# Patient Record
Sex: FEMALE | NOT HISPANIC OR LATINO | Employment: UNEMPLOYED | URBAN - METROPOLITAN AREA
[De-identification: names, ages, dates, MRNs, and addresses within clinical notes are randomized per-mention and may not be internally consistent; named-entity substitution may affect disease eponyms.]

---

## 2017-01-03 ENCOUNTER — OFFICE VISIT (OUTPATIENT)
Dept: ORTHOPEDICS | Facility: CLINIC | Age: 52
End: 2017-01-03
Payer: MEDICAID

## 2017-01-03 VITALS
HEIGHT: 64 IN | BODY MASS INDEX: 27.83 KG/M2 | WEIGHT: 163 LBS | SYSTOLIC BLOOD PRESSURE: 171 MMHG | DIASTOLIC BLOOD PRESSURE: 84 MMHG | HEART RATE: 83 BPM

## 2017-01-03 DIAGNOSIS — M25.532 PAIN IN BOTH WRISTS: ICD-10-CM

## 2017-01-03 DIAGNOSIS — M25.531 PAIN IN BOTH WRISTS: ICD-10-CM

## 2017-01-03 DIAGNOSIS — M65.9 SYNOVITIS OF WRIST: Primary | ICD-10-CM

## 2017-01-03 PROCEDURE — 99214 OFFICE O/P EST MOD 30 MIN: CPT | Mod: S$PBB,,, | Performed by: ORTHOPAEDIC SURGERY

## 2017-01-03 PROCEDURE — 99213 OFFICE O/P EST LOW 20 MIN: CPT | Mod: PBBFAC,PO | Performed by: ORTHOPAEDIC SURGERY

## 2017-01-03 PROCEDURE — 99999 PR PBB SHADOW E&M-EST. PATIENT-LVL III: CPT | Mod: PBBFAC,,, | Performed by: ORTHOPAEDIC SURGERY

## 2017-01-03 RX ORDER — GABAPENTIN 300 MG/1
300 CAPSULE ORAL NIGHTLY
Qty: 90 CAPSULE | Refills: 0 | Status: SHIPPED | OUTPATIENT
Start: 2017-01-03 | End: 2017-01-31 | Stop reason: SDUPTHER

## 2017-01-03 RX ORDER — MELOXICAM 15 MG/1
15 TABLET ORAL DAILY
Qty: 60 TABLET | Refills: 0 | Status: SHIPPED | OUTPATIENT
Start: 2017-01-03 | End: 2017-01-31 | Stop reason: SDUPTHER

## 2017-01-03 RX ORDER — METHYLPREDNISOLONE 4 MG/1
TABLET ORAL
Qty: 1 PACKAGE | Refills: 0 | Status: SHIPPED | OUTPATIENT
Start: 2017-01-03 | End: 2017-01-24

## 2017-01-03 NOTE — MR AVS SNAPSHOT
[facility]  [address]  [place] [address]  Phone: [phone]                  Dena Marcano   1/3/2017 1:15 PM   Office Visit    Description:  Female : 1965   Provider:  Roger Gerber MD   Department:  [facility]           Reason for Visit     Right Wrist - Pain     Left Wrist - Pain           Diagnoses this Visit        Comments    Synovitis of wrist    -  Primary     Pain in both wrists                To Do List           Future Appointments        Provider Department Dept Phone    2017 10:00 AM Manuel aBrba DO [facility] [phone]    1/10/2017 10:00 AM DIABETES EMPOWERMENT [facility] Diabetes Management [phone]    2017 10:15 AM Roger Gerber MD [facility] [phone]    2017 10:05 AM LAB, [place] [facility] [phone]    2017 9:40 AM Kody Enriquez MD [facility] Hematology [phone]      Goals (5 Years of Data)     None       These Medications        Disp Refills Start End    methylPREDNISolone (MEDROL DOSEPACK) 4 mg tablet 1 Package 0 1/3/2017 2017    use as directed; complete this before starting meloxicam    Pharmacy: [place] [id number] [place] [facility] [address] [address] Ph #: [phone]       meloxicam (MOBIC) 15 MG tablet 60 tablet 0 1/3/2017     Take 1 tablet (15 mg total) by mouth once daily. Start this after the medrol dose pack - Oral    Pharmacy: [facility] [id number] [place] [facility] [address] [address] Ph #: [phone]       gabapentin (NEURONTIN) 300 MG capsule 90 capsule 0 1/3/2017 1/3/2018    Take 1 capsule (300 mg total) by mouth every evening. Begin taking one at night tonight - Oral    Pharmacy: [facility] [id number] [place] [facility] [address] [address] Ph #: [phone]         [facility] On Call     [facility] On Call Nurse Care Line -  Assistance  Registered nurses in the [facility] provide clinical advisement, health education,  appointment booking, and other advisory services.  Call for this free service at [phone].             Medications           Message regarding Medications     Verify the changes and/or additions to your medication regime listed below are the same as discussed with your clinician today.  If any of these changes or additions are incorrect, please notify your healthcare provider.        START taking these NEW medications        Refills    methylPREDNISolone (MEDROL DOSEPACK) 4 mg tablet 0    Sig: use as directed; complete this before starting meloxicam    Class: Normal    meloxicam (MOBIC) 15 MG tablet 0    Sig: Take 1 tablet (15 mg total) by mouth once daily. Start this after the medrol dose pack    Class: Normal    Route: Oral    gabapentin (NEURONTIN) 300 MG capsule 0    Sig: Take 1 capsule (300 mg total) by mouth every evening. Begin taking one at night tonight    Class: Normal    Route: Oral           Verify that the below list of medications is an accurate representation of the medications you are currently taking.  If none reported, the list may be blank. If incorrect, please contact your healthcare provider. Carry this list with you in case of emergency.           Current Medications     anastrozole (ARIMIDEX) 1 mg Tab Take 1 tablet (1 mg total) by mouth once daily.    ascorbic acid (VITAMIN C) 1000 MG tablet Take 1 tablet (1,000 mg total) by mouth once daily.    atorvastatin (LIPITOR) 20 MG tablet Take 1 tablet (20 mg total) by mouth once daily.    CALCIUM CARB/VIT D3/MINERALS (CALCIUM CARBONATE-VIT D3-MIN) 600 mg (1,500 mg)-200 unit Chew Take 2 tablets by mouth once daily.    ferrous sulfate 324 mg (65 mg iron) TbEC Take 1 tablet (325 mg total) by mouth once daily.    gabapentin (NEURONTIN) 300 MG capsule Take 1 capsule (300 mg total) by mouth every evening. Begin taking one at night tonight    glimepiride (AMARYL) 2 MG tablet Take 1 tablet (2 mg total) by mouth before breakfast.    lisinopril 10 MG  "tablet Take 1 tablet (10 mg total) by mouth once daily.    meloxicam (MOBIC) 15 MG tablet Take 1 tablet (15 mg total) by mouth once daily. Start this after the medrol dose pack    metformin (GLUCOPHAGE) 1000 MG tablet Take 1 tablet (1,000 mg total) by mouth 2 (two) times daily with meals.    methylPREDNISolone (MEDROL DOSEPACK) 4 mg tablet use as directed; complete this before starting meloxicam    ONETOUCH ULTRA TEST Strp USE TO TEST BLOOD SUGAR ONCE DAILY           Clinical Reference Information           Vital Signs - Last Recorded  Most recent update: 1/3/2017  1:30 PM by Abena Lugo LPN    BP Pulse Ht Wt BMI    (!) 171/84 83 5' 4" (1.626 m) 73.9 kg (163 lb) 27.98 kg/m2      Blood Pressure          Most Recent Value    BP  (!)  171/84      Allergies as of 1/3/2017     No Known Allergies      Immunizations Administered on Date of Encounter - 1/3/2017     None      Orders Placed During Today's Visit      Normal Orders This Visit    Ambulatory consult to Occupational Therapy       "

## 2017-01-03 NOTE — LETTER
January 3, 2017      Lucie Rose NP  [address] [facility] [place] [address]           [facility]  [address] [facility] [place] [address]  Phone: [phone]          Patient: Dena Marcano   MR Number: [id number]   YOB: 1965   Date of Visit: 1/3/2017       Dear Lucie Rose:    Thank you for referring Dena Marcano to me for evaluation. Attached you will find relevant portions of my assessment and plan of care.    If you have questions, please do not hesitate to call me. I look forward to following Dena Marcano along with you.    Sincerely,    Roger Gerber MD    Enclosure  CC:  No Recipients    If you would like to receive this communication electronically, please contact [email or web address] or [phone] to request more information on [employer] access.    For providers and/or their staff who would like to refer a patient to [facility], please contact us through our one-stop-shop provider referral line, Jose Francisco Hoffman, at [phone].    If you feel you have received this communication in error or would no longer like to receive these types of communications, please e-mail [email or web address]

## 2017-01-03 NOTE — PROGRESS NOTES
Subjective:          Chief Complaint: Dena Marcano is a 51 y.o. female who had concerns including Pain of the Right Wrist and Pain of the Left Wrist.    Pain   This is a new problem. The problem occurs constantly. The problem has been rapidly worsening. Associated symptoms include diaphoresis and myalgias. Pertinent negatives include no chest pain, chills, coughing, fever, headaches, nausea, rash or vomiting. She has tried acetaminophen for the symptoms. The treatment provided no relief.   She has also tried bilateral wrist bracing without any change in the pain. She has some additional upper extremity symptoms as well, however the wrist/hand pain is primary. NO numbness or tingling noted.by the patient    Review of Systems   Constitution: Positive for diaphoresis. Negative for chills and fever.        Hair loss   HENT: Positive for tinnitus. Negative for ear pain, headaches, hearing loss and nosebleeds.    Eyes: Negative for pain, redness and visual disturbance.   Cardiovascular: Negative for chest pain and palpitations.   Respiratory: Negative for cough and shortness of breath.    Skin: Negative for itching and rash.   Musculoskeletal: Positive for joint pain, muscle weakness, myalgias and stiffness.   Gastrointestinal: Positive for diarrhea. Negative for constipation, nausea and vomiting.   Genitourinary: Negative for dysuria, frequency and hematuria.   Neurological: Negative for seizures.   Psychiatric/Behavioral: The patient is not nervous/anxious.    Allergic/Immunologic: Negative for environmental allergies.                   Objective:        General: Dena is well-developed, well-nourished, appears stated age, in no acute distress, alert and oriented to time, place and person.     General    Vitals reviewed.  Constitutional: She is oriented to person, place, and time. She appears well-developed and well-nourished. She appears distressed.   HENT:   Head: Normocephalic and atraumatic.   Nose: Nose normal.    Eyes: Pupils are equal, round, and reactive to light.   Cardiovascular: Normal rate.    Pulmonary/Chest: Effort normal.   Neurological: She is alert and oriented to person, place, and time.   Psychiatric: Judgment and thought content normal.   Patient is tearful regarding her hand/wrist pain and her inability to perform ADLs secondary to it.             Right Hand/Wrist Exam     Inspection   Scars: Wrist - absent Hand -  absent  Effusion: Wrist - absent Hand -  absent  Bruising: Wrist - absent Hand -  absent  Deformity: Wrist - deformity Hand -  deformity    Pain   Hand - The patient exhibits pain of the extensory musculature.  Wrist - The patient exhibits pain of the extensory musculature.    Swelling   Hand - The patient is swollen on the extensory musculature.  Wrist - The patient is swollen on the extensory musculature.    Tenderness   The patient is tender to palpation of the dorsal area.    Other     Neuorologic Exam    Median Distribution: normal  Ulnar Distribution: normal  Radial Distribution: normal    Comments:  Very difficult to examine the patient bilaterally secondary to pain. She is unable to  with either hand.      Left Hand/Wrist Exam     Inspection   Scars: Wrist - absent Hand -  absent  Effusion: Wrist - absent Hand -  absent  Bruising: Wrist - absent Hand -  absent  Deformity: Wrist - absent Hand -  absent    Pain   Hand - The patient exhibits pain of the extensory musculature.  Wrist - The patient exhibits pain of the extensory musculature.    Swelling   Hand - The patient is swollen on the extensory musculature.  Wrist - The patient is swollen on the extensory musculature.    Tenderness   The patient is tender to palpation of the dorsal area.     Other     Sensory Exam  Median Distribution: normal  Ulnar Distribution: normal  Radial Distribution: normal          Vascular Exam       Capillary Refill  Right Hand: normal capillary refill  Left Hand: normal capillary refill    Current and  previous radiographic studies and results were reviewed with the patient:     3 views of each wrist demonstrate minimal degenerative changes at the first carpometacarpal joints but no evidence of fracture, subluxation or bony erosion.        Assessment:       Encounter Diagnoses   Name Primary?    Synovitis of wrist Yes    Pain in both wrists           Plan:         I spent >50% of the time discussing the patient's current symptoms and x-ray findings with her as well as discussing some of her labwork that she was unaware of. She is complaining of hair loss, diffuse myalgias and joint pain. Her TSH is low and A1c is very elevated. We will attempt to get her the appropriate f/u for the abnormal TSH in particular. She is working on getting her sugars better controlled and states they are in the 90s at home.  Medrol Dosepack for inflammation  Cannot give regular NSAIDs secondary to listed dx of CKD Stage 3  PHysical therapy for gradual decreases in pain and increases in motion and function  F/U

## 2017-01-09 ENCOUNTER — OFFICE VISIT (OUTPATIENT)
Dept: ENDOCRINOLOGY | Facility: CLINIC | Age: 52
End: 2017-01-09
Payer: MEDICAID

## 2017-01-09 ENCOUNTER — LAB VISIT (OUTPATIENT)
Dept: LAB | Facility: HOSPITAL | Age: 52
End: 2017-01-09
Attending: INTERNAL MEDICINE
Payer: MEDICAID

## 2017-01-09 VITALS
WEIGHT: 167.75 LBS | HEIGHT: 64 IN | HEART RATE: 87 BPM | SYSTOLIC BLOOD PRESSURE: 148 MMHG | BODY MASS INDEX: 28.64 KG/M2 | DIASTOLIC BLOOD PRESSURE: 80 MMHG

## 2017-01-09 DIAGNOSIS — E11.9 TYPE 2 DIABETES MELLITUS WITHOUT COMPLICATION, WITHOUT LONG-TERM CURRENT USE OF INSULIN: ICD-10-CM

## 2017-01-09 DIAGNOSIS — E05.90 SUBCLINICAL HYPERTHYROIDISM: ICD-10-CM

## 2017-01-09 DIAGNOSIS — E05.90 SUBCLINICAL HYPERTHYROIDISM: Primary | ICD-10-CM

## 2017-01-09 LAB — THYROPEROXIDASE IGG SERPL-ACNC: <6 IU/ML

## 2017-01-09 PROCEDURE — 99214 OFFICE O/P EST MOD 30 MIN: CPT | Mod: S$PBB,,, | Performed by: INTERNAL MEDICINE

## 2017-01-09 PROCEDURE — 99999 PR PBB SHADOW E&M-EST. PATIENT-LVL III: CPT | Mod: PBBFAC,,, | Performed by: INTERNAL MEDICINE

## 2017-01-09 PROCEDURE — 80053 COMPREHEN METABOLIC PANEL: CPT

## 2017-01-09 PROCEDURE — 84443 ASSAY THYROID STIM HORMONE: CPT

## 2017-01-09 PROCEDURE — 86376 MICROSOMAL ANTIBODY EACH: CPT

## 2017-01-09 PROCEDURE — 84481 FREE ASSAY (FT-3): CPT

## 2017-01-09 PROCEDURE — 84439 ASSAY OF FREE THYROXINE: CPT

## 2017-01-09 PROCEDURE — 84445 ASSAY OF TSI GLOBULIN: CPT

## 2017-01-09 PROCEDURE — 83036 HEMOGLOBIN GLYCOSYLATED A1C: CPT

## 2017-01-09 NOTE — PROGRESS NOTES
CHIEF COMPLAINT: Subclinical Hyperthyroidism  51 year old being seen as a new patient to me. Seeing [facility] for DM management. Found to have a suppressed TSH. She has noticed some hair loss over the past 3-4 months. No palpitations. No tremors. She has some diarrhea but states it is chronic. Occasionally diaphoretic      PAST MEDICAL HISTORY/PAST SURGICAL HISTORY:  Reviewed in EPIC    SOCIAL HISTORY: No T/A    FAMILY HISTORY:  + DM 2. No known thyroid disease.     MEDICATIONS/ALLERGIES: The patient's MedCard has been updated and reviewed.      ROS:   Constitutional: No recent significant weight change  Eyes: No recent visual changes  ENT: No dysphagia  Cardiovascular: Denies current anginal symptoms  Respiratory: Denies current respiratory difficulty  Gastrointestinal: No N/V  GenitoUrinary - No dysuria  Skin: No new skin rash  Neurologic: No focal neurologic complaints  Remainder ROS negative        PE:    GENERAL: Well developed, well nourished.  PSYCH:  appropriate mood and affect  EYES:  PERRL, EOM intact.  ENT: Nares patent, oropharynx clear, mucosa pink,   NECK: Supple, trachea midline, No palpable thyroid nodules  CHEST: Resp even and unlabored, CTA bilateral.  CARDIAC: RRR, S1, S2 heard, no murmurs, rubs, S3, or S4  VASCULAR:  DP pulses +2/4 bilaterally, no edema  NEURO: Gait steady, CN II-VII grossly intact  SKIN: No areas of breakdown, no acanthosis nigracans.    Results for JESSE SALCEDO (MRN [id number]) as of 1/9/2017 10:19   Ref. Range 10/30/2015 09:15 8/9/2016 09:29 10/4/2016 10:00 12/20/2016 15:22   TSH Latest Ref Range: 0.400 - 4.000 uIU/mL 0.380 (L) 0.218 (L)  0.231 (L)   Free T4 Latest Ref Range: 0.71 - 1.51 ng/dL 1.10 1.15  1.11       ASSESSMENT/PLAN:  1. Subclinical Hyperthyroidism- TSH mildly suppressed. Main compliant is hair loss. Has diarrhea but appears chronic. Will do w/u as seen below. Depending on w/u.     2. DM 2- BG of 58 in clinic. States that A1c increased due to steroids. Now off all  steroids. No glucose logs with her today. Recently finished steroids and had a BG in the 60's 2 days ago. Will stop glimepiride. Patient kept in office until BG increased.  with her.Also advised patient to go ahead and eat lunch      FOLLOWUP  Today- CMP, A1c, TSH, Ft4, Ft3, TSI, TPO  Thyroid Uptake and scan.

## 2017-01-09 NOTE — MR AVS SNAPSHOT
[facility]  [address] [facility]  [facility] [address]  Phone: [phone]  Fax: [phone]                  Dena Marcano   2017 10:00 AM   Office Visit    Description:  Female : 1965   Provider:  Manuel Barba DO   Department:  [facility]           Diagnoses this Visit        Comments    Subclinical hyperthyroidism    -  Primary     Type 2 diabetes mellitus without complication, without long-term current use of insulin                To Do List           Future Appointments        Provider Department Dept Phone    2017 11:35 AM LAB, [place] [facility] [phone]    1/10/2017 10:00 AM DIABETES EMPOWERMENT [facility] Diabetes Management [phone]    2017 11:00 AM [facility] [phone]    2017 10:15 AM Roger Gerber MD [facility] [phone]    2017 10:05 AM LAB, [place] [facility] [phone]      Goals (5 Years of Data)     None      [facility]     [facility] Nurse Care Line - /7 Assistance  Registered nurses in the [facility] provide clinical advisement, health education, appointment booking, and other advisory services.  Call for this free service at [phone].             Medications           Message regarding Medications     Verify the changes and/or additions to your medication regime listed below are the same as discussed with your clinician today.  If any of these changes or additions are incorrect, please notify your healthcare provider.        STOP taking these medications     glimepiride (AMARYL) 2 MG tablet Take 1 tablet (2 mg total) by mouth before breakfast.           Verify that the below list of medications is an accurate representation of the medications you are currently taking.  If none reported, the list may be blank. If incorrect, please contact your healthcare provider. Carry this list  "with you in case of emergency.           Current Medications     anastrozole (ARIMIDEX) 1 mg Tab Take 1 tablet (1 mg total) by mouth once daily.    ascorbic acid (VITAMIN C) 1000 MG tablet Take 1 tablet (1,000 mg total) by mouth once daily.    atorvastatin (LIPITOR) 20 MG tablet Take 1 tablet (20 mg total) by mouth once daily.    ferrous sulfate 324 mg (65 mg iron) TbEC Take 1 tablet (325 mg total) by mouth once daily.    gabapentin (NEURONTIN) 300 MG capsule Take 1 capsule (300 mg total) by mouth every evening. Begin taking one at night tonight    lisinopril 10 MG tablet Take 1 tablet (10 mg total) by mouth once daily.    meloxicam (MOBIC) 15 MG tablet Take 1 tablet (15 mg total) by mouth once daily. Start this after the medrol dose pack    metformin (GLUCOPHAGE) 1000 MG tablet Take 1 tablet (1,000 mg total) by mouth 2 (two) times daily with meals.    methylPREDNISolone (MEDROL DOSEPACK) 4 mg tablet use as directed; complete this before starting meloxicam    ONETOUCH ULTRA TEST Strp USE TO TEST BLOOD SUGAR ONCE DAILY    CALCIUM CARB/VIT D3/MINERALS (CALCIUM CARBONATE-VIT D3-MIN) 600 mg (1,500 mg)-200 unit Chew Take 2 tablets by mouth once daily.           Clinical Reference Information           Vital Signs - Last Recorded  Most recent update: 1/9/2017 10:20 AM by Loren Alfaro LPN    BP Pulse Ht Wt BMI    (!) 148/80 (BP Location: Right arm, Patient Position: Sitting, BP Method: Manual) 87 5' 4" (1.626 m) 76.1 kg (167 lb 12.3 oz) 28.8 kg/m2      Blood Pressure          Most Recent Value    BP  (!)  148/80      Allergies as of 1/9/2017     No Known Allergies      Immunizations Administered on Date of Encounter - 1/9/2017     None      Orders Placed During Today's Visit     Future Labs/Procedures Expected by Expires    Comprehensive metabolic panel  1/9/2017 1/10/2018    Hemoglobin A1c  1/9/2017 1/10/2018    NM Thyroid Scan and Uptake Sngl or Multi  1/9/2017 1/9/2018    T3, free  1/9/2017 1/9/2018    T4, free  " 1/9/2017 1/9/2018    Thyroid peroxidase antibody  1/9/2017 1/9/2018    Thyroid stimulating immunoglobulin  1/9/2017 1/9/2018    TSH  1/9/2017 1/9/2018

## 2017-01-10 ENCOUNTER — CLINICAL SUPPORT (OUTPATIENT)
Dept: DIABETES | Facility: CLINIC | Age: 52
End: 2017-01-10
Payer: MEDICAID

## 2017-01-10 ENCOUNTER — TELEPHONE (OUTPATIENT)
Dept: ENDOCRINOLOGY | Facility: CLINIC | Age: 52
End: 2017-01-10

## 2017-01-10 VITALS
DIASTOLIC BLOOD PRESSURE: 72 MMHG | WEIGHT: 167.75 LBS | BODY MASS INDEX: 28.64 KG/M2 | HEIGHT: 64 IN | HEART RATE: 83 BPM | SYSTOLIC BLOOD PRESSURE: 134 MMHG

## 2017-01-10 DIAGNOSIS — E11.9 TYPE 2 DIABETES MELLITUS WITHOUT COMPLICATION, WITHOUT LONG-TERM CURRENT USE OF INSULIN: Primary | ICD-10-CM

## 2017-01-10 DIAGNOSIS — R94.6 ABNORMAL THYROID FUNCTION TEST: Primary | ICD-10-CM

## 2017-01-10 DIAGNOSIS — Z51.81 MEDICATION MONITORING ENCOUNTER: ICD-10-CM

## 2017-01-10 LAB
ALBUMIN SERPL BCP-MCNC: 3.5 G/DL
ALP SERPL-CCNC: 50 U/L
ALT SERPL W/O P-5'-P-CCNC: 13 U/L
ANION GAP SERPL CALC-SCNC: 8 MMOL/L
AST SERPL-CCNC: 12 U/L
BILIRUB SERPL-MCNC: 0.1 MG/DL
BUN SERPL-MCNC: 21 MG/DL
CALCIUM SERPL-MCNC: 9.6 MG/DL
CHLORIDE SERPL-SCNC: 108 MMOL/L
CO2 SERPL-SCNC: 24 MMOL/L
CREAT SERPL-MCNC: 1 MG/DL
EST. GFR  (AFRICAN AMERICAN): >60 ML/MIN/1.73 M^2
EST. GFR  (NON AFRICAN AMERICAN): >60 ML/MIN/1.73 M^2
ESTIMATED AVG GLUCOSE: 235 MG/DL
GLUCOSE SERPL-MCNC: 99 MG/DL
HBA1C MFR BLD HPLC: 9.8 %
POTASSIUM SERPL-SCNC: 4.8 MMOL/L
PROT SERPL-MCNC: 6.9 G/DL
SODIUM SERPL-SCNC: 140 MMOL/L
T3FREE SERPL-MCNC: 2.5 PG/ML
T4 FREE SERPL-MCNC: 1.09 NG/DL
TSH SERPL DL<=0.005 MIU/L-ACNC: 0.57 UIU/ML

## 2017-01-10 PROCEDURE — 99999 PR PBB SHADOW E&M-EST. PATIENT-LVL III: CPT | Mod: PBBFAC,,,

## 2017-01-10 PROCEDURE — 99213 OFFICE O/P EST LOW 20 MIN: CPT | Mod: S$PBB,,, | Performed by: NURSE PRACTITIONER

## 2017-01-10 PROCEDURE — 99213 OFFICE O/P EST LOW 20 MIN: CPT | Mod: PBBFAC,PO

## 2017-01-10 NOTE — MR AVS SNAPSHOT
[place]  [address]  [place] [facility] [place] [address]  Phone: [phone]                  Dena Marcano   1/10/2017 10:00 AM   Clinical Support    Description:  Female : 1965   Provider:  DIABETES EMPOWERMENT [facility]   Department:  [place] Diabetes Management           Diagnoses this Visit        Comments    Type 2 diabetes mellitus without complication, without long-term current use of insulin    -  Primary            To Do List           Future Appointments        Provider Department Dept Phone    2017 11:00 AM [facility] [phone]    2017 7:30 AM [facility] [phone]    2017 12:15 PM NM1 [facility] [phone]    2017 7:30 AM [facility] [phone]    2017 10:15 AM Roger Gerber MD [place] [facility] [phone]      Goals (5 Years of Data)     None      [facility] On Call     [facility] On [facility] Nurse Care Line -  Assistance  Registered nurses in the [facility] provide clinical advisement, health education, appointment booking, and other advisory services.  Call for this free service at [phone].             Medications           Message regarding Medications     Verify the changes and/or additions to your medication regime listed below are the same as discussed with your clinician today.  If any of these changes or additions are incorrect, please notify your healthcare provider.             Verify that the below list of medications is an accurate representation of the medications you are currently taking.  If none reported, the list may be blank. If incorrect, please contact your healthcare provider. Carry this list with you in case of emergency.           Current Medications     anastrozole (ARIMIDEX) 1 mg Tab Take 1 tablet (1 mg total) by mouth once daily.    ascorbic acid (VITAMIN C) 1000 MG tablet  "Take 1 tablet (1,000 mg total) by mouth once daily.    atorvastatin (LIPITOR) 20 MG tablet Take 1 tablet (20 mg total) by mouth once daily.    ferrous sulfate 324 mg (65 mg iron) TbEC Take 1 tablet (325 mg total) by mouth once daily.    gabapentin (NEURONTIN) 300 MG capsule Take 1 capsule (300 mg total) by mouth every evening. Begin taking one at night tonight    lisinopril 10 MG tablet Take 1 tablet (10 mg total) by mouth once daily.    meloxicam (MOBIC) 15 MG tablet Take 1 tablet (15 mg total) by mouth once daily. Start this after the medrol dose pack    metformin (GLUCOPHAGE) 1000 MG tablet Take 1 tablet (1,000 mg total) by mouth 2 (two) times daily with meals.    methylPREDNISolone (MEDROL DOSEPACK) 4 mg tablet use as directed; complete this before starting meloxicam    ONETOUCH ULTRA TEST Strp USE TO TEST BLOOD SUGAR ONCE DAILY    CALCIUM CARB/VIT D3/MINERALS (CALCIUM CARBONATE-VIT D3-MIN) 600 mg (1,500 mg)-200 unit Chew Take 2 tablets by mouth once daily.           Clinical Reference Information           Vital Signs - Last Recorded  Most recent update: 1/10/2017  9:51 AM by Loren Alfaro LPN    BP Pulse Ht Wt BMI    134/72 (BP Location: Right arm, Patient Position: Sitting, BP Method: Manual) 83 5' 4" (1.626 m) 76.1 kg (167 lb 12.3 oz) 28.8 kg/m2      Blood Pressure          Most Recent Value    BP  134/72      Allergies as of 1/10/2017     No Known Allergies      Immunizations Administered on Date of Encounter - 1/10/2017     None      Orders Placed During Today's Visit     Future Labs/Procedures Expected by Expires    Hemoglobin A1c  1/10/2017 1/10/2018      "

## 2017-01-10 NOTE — PROGRESS NOTES
CC:  Uncontrolled Type 2 DM     HPI:  Dena Marcano 51 y.o.  or Other  female referred by Dr. Saha/AMI Rose NP  for eval of Diabetes.      [facility] - Visit # 1.   ---back today for interim glucose log review.     The patient has had Type 2  Diabetes mellitus  diagnosed approximately 2013 along with known associated co-morbidities pending review  including HTN, HLP.  Pt has also been treated for breast cancer in the last year with lumpectomy and RTX.  Pt was prescribed metformin and glipizide.  She never started the glipizide because she thought she was taking too many pills already.  She knows nothing about an a1c.  She checks fbs about EVERY OTHER DAY.    Interim Events:  Pt doing fantastic.  Taking metformin 1000 bid,  Was taking glimipiride but started having glucoses in 50-60--so glimpiride was stopped per Dr. Barba as she is seeing him for subclinical hypothyroidism. Has also been watching diet.             CURRENT DIABETES MEDICATIONS: metformin 1000 bid--glimipiride 2 mg-just stopped.     SIGNIFICANT DIABETES MED HISTORY: metformin 500 twice daily     HOSPITALIZATIONS FOR DIABETES OR RELATED COMPLICATIONS:  No    BLOOD GLUCOSE MONITORING:  n          HYPOGLYCEMIA: yes    DIABETES COMPLICATIONS: none     CURRENT A1C:      OCCUPATION: none     CURRENT DAILY ROUTINE (meals/meds): pending review     CURRENT EXERCISE:  None     ALCOHOL: none    SMOKING: none     SUPPORT SYSTEM: none     DIABETES EDUCATION HISTORY: none     MEDICATIONS, ALLERGIES, LABS, VS's, MEDICAL, SURGICAL and SOCIAL HISTORY reviewed and updated in the appropriate sections during this encounter.    ROS:     CGMS interpretation:       PE:  Constitutional:    Vitals:    01/10/17 0948   BP: 134/72   Pulse: 83        Well developed, well nourished, NAD.      LABS  Hemoglobin A1C   Date Value Ref Range Status   10/04/2016 11.5 (H) 4.5 - 6.2 % Final     Comment:     According to ADA guidelines, hemoglobin  A1C <7.0% represents  optimal control in non-pregnant diabetic patients.  Different  metrics may apply to specific populations.   Standards of Medical Care in Diabetes - 2016.  For the purpose of screening for the presence of diabetes:  <5.7%     Consistent with the absence of diabetes  5.7-6.4%  Consistent with increasing risk for diabetes   (prediabetes)  >or=6.5%  Consistent with diabetes  Currently no consensus exists for use of hemoglobin A1C  for diagnosis of diabetes for children.     08/09/2016 11.8 (H) 4.5 - 6.2 % Final     Comment:     According to ADA guidelines, hemoglobin A1C <7.0% represents  optimal control in non-pregnant diabetic patients.  Different  metrics may apply to specific populations.   Standards of Medical Care in Diabetes - 2016.  For the purpose of screening for the presence of diabetes:  <5.7%     Consistent with the absence of diabetes  5.7-6.4%  Consistent with increasing risk for diabetes   (prediabetes)  >or=6.5%  Consistent with diabetes  Currently no consensus exists for use of hemoglobin A1C  for diagnosis of diabetes for children.     10/30/2015 7.5 (H) 4.5 - 6.2 % Final       Chemistry        Component Value Date/Time     01/09/2017 1128    K 4.8 01/09/2017 1128     01/09/2017 1128    CO2 24 01/09/2017 1128    BUN 21 (H) 01/09/2017 1128    CREATININE 1.0 01/09/2017 1128    GLU 99 01/09/2017 1128        Component Value Date/Time    CALCIUM 9.6 01/09/2017 1128    ALKPHOS 50 (L) 01/09/2017 1128    AST 12 01/09/2017 1128    ALT 13 01/09/2017 1128    BILITOT 0.1 01/09/2017 1128          Lab Results   Component Value Date    CHOL 174 08/09/2016    CHOL 174 08/09/2016    CHOL 147 07/30/2015     Lab Results   Component Value Date    HDL 43 08/09/2016    HDL 43 08/09/2016    HDL 42 07/30/2015     Lab Results   Component Value Date    LDLCALC 100.6 08/09/2016    LDLCALC 100.6 08/09/2016    LDLCALC 82.0 07/30/2015     Lab Results   Component Value Date    TRIG 152 (H)  08/09/2016    TRIG 152 (H) 08/09/2016    TRIG 115 07/30/2015     Lab Results   Component Value Date    CHOLHDL 24.7 08/09/2016    CHOLHDL 24.7 08/09/2016    CHOLHDL 28.6 07/30/2015     @RESUFAST([facility])    STANDARDS of CARE:  Statin:  Yes - atorvastatin   ACEI or ARB:  Yes - lisinipril 5 mg   ASA:  No      ______________________________________________________________________     ASSESSMENT  1. Type 2 diabetes mellitus without complication, without long-term current use of insulin  Hemoglobin A1c   2. Medication monitoring encounter           PLAN  -d/c glimipride--continue metfformin 1000 bid,  Pt advised to continue checking glucoses qd but at different times.  If glucoses trend up will consider 1 mg glimipiride.     ORDERS 1/10/17  F/u as previously ordered.

## 2017-01-10 NOTE — TELEPHONE ENCOUNTER
Pt's  informed thyroid levels normal per Dr. Barba and needs repeat TSH in 3 mos.  Lab appt scheduled and letter mailed.

## 2017-01-11 LAB — TSI SER-ACNC: <0.1 IU/L

## 2017-01-16 ENCOUNTER — HOSPITAL ENCOUNTER (OUTPATIENT)
Dept: RADIOLOGY | Facility: HOSPITAL | Age: 52
Discharge: HOME | End: 2017-01-16
Attending: INTERNAL MEDICINE
Payer: MEDICAID

## 2017-01-16 ENCOUNTER — TELEPHONE (OUTPATIENT)
Dept: ENDOCRINOLOGY | Facility: CLINIC | Age: 52
End: 2017-01-16

## 2017-01-16 DIAGNOSIS — E05.90 SUBCLINICAL HYPERTHYROIDISM: ICD-10-CM

## 2017-01-16 PROCEDURE — 78014 THYROID IMAGING W/BLOOD FLOW: CPT | Mod: 26,,, | Performed by: RADIOLOGY

## 2017-01-17 ENCOUNTER — HOSPITAL ENCOUNTER (OUTPATIENT)
Dept: RADIOLOGY | Facility: HOSPITAL | Age: 52
Discharge: HOME | End: 2017-01-17
Attending: INTERNAL MEDICINE
Payer: MEDICAID

## 2017-01-17 PROCEDURE — 78014 THYROID IMAGING W/BLOOD FLOW: CPT | Mod: TC,PO

## 2017-01-31 ENCOUNTER — TELEPHONE (OUTPATIENT)
Dept: ENDOCRINOLOGY | Facility: CLINIC | Age: 52
End: 2017-01-31

## 2017-01-31 ENCOUNTER — OFFICE VISIT (OUTPATIENT)
Dept: ORTHOPEDICS | Facility: CLINIC | Age: 52
End: 2017-01-31
Payer: MEDICAID

## 2017-01-31 ENCOUNTER — HOSPITAL ENCOUNTER (OUTPATIENT)
Dept: RADIOLOGY | Facility: HOSPITAL | Age: 52
Discharge: HOME | End: 2017-01-31
Attending: ORTHOPAEDIC SURGERY
Payer: MEDICAID

## 2017-01-31 VITALS
SYSTOLIC BLOOD PRESSURE: 161 MMHG | DIASTOLIC BLOOD PRESSURE: 84 MMHG | HEART RATE: 79 BPM | WEIGHT: 167 LBS | HEIGHT: 64 IN | BODY MASS INDEX: 28.51 KG/M2

## 2017-01-31 DIAGNOSIS — M25.532 PAIN IN BOTH WRISTS: ICD-10-CM

## 2017-01-31 DIAGNOSIS — M25.531 PAIN IN BOTH WRISTS: ICD-10-CM

## 2017-01-31 DIAGNOSIS — M89.8X2 PAIN OF RIGHT HUMERUS: Primary | ICD-10-CM

## 2017-01-31 DIAGNOSIS — M25.612 SHOULDER STIFFNESS, LEFT: ICD-10-CM

## 2017-01-31 DIAGNOSIS — M65.9 SYNOVITIS OF WRIST: ICD-10-CM

## 2017-01-31 DIAGNOSIS — M89.8X2 PAIN OF RIGHT HUMERUS: ICD-10-CM

## 2017-01-31 PROCEDURE — 99999 PR PBB SHADOW E&M-EST. PATIENT-LVL III: CPT | Mod: PBBFAC,,, | Performed by: ORTHOPAEDIC SURGERY

## 2017-01-31 PROCEDURE — 73060 X-RAY EXAM OF HUMERUS: CPT | Mod: 26,RT,, | Performed by: RADIOLOGY

## 2017-01-31 PROCEDURE — 99214 OFFICE O/P EST MOD 30 MIN: CPT | Mod: S$PBB,,, | Performed by: ORTHOPAEDIC SURGERY

## 2017-01-31 PROCEDURE — 73060 X-RAY EXAM OF HUMERUS: CPT | Mod: TC,PO,RT

## 2017-01-31 RX ORDER — GABAPENTIN 300 MG/1
300 CAPSULE ORAL NIGHTLY
Qty: 90 CAPSULE | Refills: 0 | Status: SHIPPED | OUTPATIENT
Start: 2017-01-31 | End: 2017-06-22 | Stop reason: SDUPTHER

## 2017-01-31 RX ORDER — MELOXICAM 15 MG/1
15 TABLET ORAL DAILY
Qty: 60 TABLET | Refills: 0 | Status: SHIPPED | OUTPATIENT
Start: 2017-01-31 | End: 2017-05-05 | Stop reason: SDUPTHER

## 2017-01-31 NOTE — MR AVS SNAPSHOT
[facility]  [address]  [facility] [address]  Phone: [phone]                  Dena Marcano   2017 10:15 AM   Office Visit    Description:  Female : 1965   Provider:  Roger Gerber MD   Department:  [facility]           Reason for Visit     Right Wrist - Pain     Left Wrist - Pain           Diagnoses this Visit        Comments    Synovitis of wrist    -  Primary     Pain in both wrists         Pain of right humerus                To Do List           Future Appointments        Provider Department Dept Phone    2017 12:00 PM [facility] XR3 [facility] [phone]    2017 10:30 AM [facility] MRI1 [facility] [phone]    2017 10:05 AM [facility] [place] [facility] [phone]    2017 9:40 AM Kody Enriquez MD [facility] [phone]    2017 10:00 AM CHAIR 12, [facility] CHEMO [facility] [phone]      Goals (5 Years of Data)     None       These Medications        Disp Refills Start End    gabapentin (NEURONTIN) 300 MG capsule 90 capsule 0 2017    Take 1 capsule (300 mg total) by mouth every evening. Begin taking one at night tonight - Oral    Pharmacy: [place] [id number] [place] [facility] [id number] Ph #: [phone]       meloxicam (MOBIC) 15 MG tablet 60 tablet 0 2017     Take 1 tablet (15 mg total) by mouth once daily. Start this after the medrol dose pack - Oral    Pharmacy: [facility] [id number] [place] [facility] [address] [address] Ph #: [phone]         [facility]     [facility] Care [facility] -  Assistance  Registered nurses in the [facility] provide clinical advisement, health education, appointment booking, and other advisory services.  Call for this free service at [phone].             Medications           Message regarding Medications     Verify the changes and/or additions to  "your medication regime listed below are the same as discussed with your clinician today.  If any of these changes or additions are incorrect, please notify your healthcare provider.             Verify that the below list of medications is an accurate representation of the medications you are currently taking.  If none reported, the list may be blank. If incorrect, please contact your healthcare provider. Carry this list with you in case of emergency.           Current Medications     anastrozole (ARIMIDEX) 1 mg Tab Take 1 tablet (1 mg total) by mouth once daily.    ascorbic acid (VITAMIN C) 1000 MG tablet Take 1 tablet (1,000 mg total) by mouth once daily.    atorvastatin (LIPITOR) 20 MG tablet Take 1 tablet (20 mg total) by mouth once daily.    CALCIUM CARB/VIT D3/MINERALS (CALCIUM CARBONATE-VIT D3-MIN) 600 mg (1,500 mg)-200 unit Chew Take 2 tablets by mouth once daily.    ferrous sulfate 324 mg (65 mg iron) TbEC Take 1 tablet (325 mg total) by mouth once daily.    gabapentin (NEURONTIN) 300 MG capsule Take 1 capsule (300 mg total) by mouth every evening. Begin taking one at night tonight    lisinopril 10 MG tablet Take 1 tablet (10 mg total) by mouth once daily.    meloxicam (MOBIC) 15 MG tablet Take 1 tablet (15 mg total) by mouth once daily. Start this after the medrol dose pack    metformin (GLUCOPHAGE) 1000 MG tablet Take 1 tablet (1,000 mg total) by mouth 2 (two) times daily with meals.    RevolverTOUCH ULTRA TEST Strp USE TO TEST BLOOD SUGAR ONCE DAILY           Clinical Reference Information           Vital Signs - Last Recorded  Most recent update: 1/31/2017 10:14 AM by Abena Lugo LPN    BP Pulse Ht Wt BMI    (!) 161/84 79 5' 4" (1.626 m) 75.8 kg (167 lb) 28.67 kg/m2      Blood Pressure          Most Recent Value    BP  (!)  161/84      Allergies as of 1/31/2017     No Known Allergies      Immunizations Administered on Date of Encounter - 1/31/2017     None      Orders Placed During Today's Visit     " Future Labs/Procedures Expected by Expires    MRI Upper Extremity W Wo Contrast Right  1/31/2017 1/31/2018    X-Ray Humerus 2 View Right  1/31/2017 2/1/2018

## 2017-01-31 NOTE — PROGRESS NOTES
Subjective:          Chief Complaint: Dena Marcano is a 51 y.o. female who had concerns including Pain of the Right Wrist and Pain of the Left Wrist.  Right humerus pain; left shoulder pain and stiffness    Pain   This is a new problem. The problem occurs constantly. The problem has been rapidly worsening. Associated symptoms include diaphoresis and myalgias. Pertinent negatives include no chest pain, chills, coughing, fever, headaches, nausea, rash or vomiting. She has tried acetaminophen for the symptoms. The treatment provided no relief.   She has also tried bilateral wrist bracing without any change in the pain. She has some additional upper extremity symptoms as well, however the wrist/hand pain is primary. NO numbness or tingling noted.by the patient    Review of Systems   Constitution: Positive for diaphoresis. Negative for chills and fever.        Hair loss   HENT: Positive for tinnitus. Negative for ear pain, headaches, hearing loss and nosebleeds.    Eyes: Negative for pain, redness and visual disturbance.   Cardiovascular: Negative for chest pain and palpitations.   Respiratory: Negative for cough and shortness of breath.    Skin: Negative for itching and rash.   Musculoskeletal: Positive for joint pain, muscle weakness, myalgias and stiffness.   Gastrointestinal: Positive for diarrhea. Negative for constipation, nausea and vomiting.   Genitourinary: Negative for dysuria, frequency and hematuria.   Neurological: Negative for seizures.   Psychiatric/Behavioral: The patient is not nervous/anxious.    Allergic/Immunologic: Negative for environmental allergies.                   Objective:        General: Dena is well-developed, well-nourished, appears stated age, in no acute distress, alert and oriented to time, place and person.     General    Vitals reviewed.  Constitutional: She is oriented to person, place, and time. She appears well-developed and well-nourished. She appears distressed.   HENT:   Head:  Normocephalic and atraumatic.   Nose: Nose normal.   Eyes: Pupils are equal, round, and reactive to light.   Cardiovascular: Normal rate.    Pulmonary/Chest: Effort normal.   Neurological: She is alert and oriented to person, place, and time.   Psychiatric: Judgment and thought content normal.   Patient is tearful regarding her hand/wrist pain and her inability to perform ADLs secondary to it.             Right Hand/Wrist Exam     Inspection   Scars: Wrist - absent Hand -  absent  Effusion: Wrist - absent Hand -  absent  Bruising: Wrist - absent Hand -  absent  Deformity: Wrist - deformity Hand -  deformity    Pain   Hand - The patient exhibits pain of the extensory musculature.  Wrist - The patient exhibits pain of the extensory musculature.    Swelling   Hand - The patient is swollen on the extensory musculature.  Wrist - The patient is swollen on the extensory musculature.    Tenderness   The patient is tender to palpation of the dorsal area.    Other     Neuorologic Exam    Median Distribution: normal  Ulnar Distribution: normal  Radial Distribution: normal    Comments:  Very difficult to examine the patient bilaterally secondary to pain. She is unable to  with either hand.      Left Hand/Wrist Exam     Inspection   Scars: Wrist - absent Hand -  absent  Effusion: Wrist - absent Hand -  absent  Bruising: Wrist - absent Hand -  absent  Deformity: Wrist - absent Hand -  absent    Pain   Hand - The patient exhibits pain of the extensory musculature.  Wrist - The patient exhibits pain of the extensory musculature.    Swelling   Hand - The patient is swollen on the extensory musculature.  Wrist - The patient is swollen on the extensory musculature.    Tenderness   The patient is tender to palpation of the dorsal area.     Other     Sensory Exam  Median Distribution: normal  Ulnar Distribution: normal  Radial Distribution: normal      Right Shoulder Exam     Inspection/Observation   Swelling: absent  Bruising:  absent  Scars: absent  Deformity: absent  Scapular Winging: absent  Scapular Dyskinesia: positive  Atrophy: absent    Tenderness   Right shoulder tenderness location: midshaft humerus with possible swelling noted.    Range of Motion   Active Abduction: normal   Passive Abduction: normal   Extension: normal   Forward Flexion: normal   Forward Elevation: normal  Adduction: normal  External Rotation 0 degrees:  70 normal   External Rotation 90 degrees: normal  Internal Rotation 0 degrees: normal   Internal Rotation 90 degrees: normal     Tests & Signs   Drop Arm: negative  Rotator Cuff Painful Arc/Range: mild    Other   Sensation: normal    Left Shoulder Exam     Inspection/Observation   Swelling: absent  Bruising: absent  Scars: absent  Deformity: absent  Scapular Dyskinesia: positive  Atrophy: present    Tenderness   The patient is tender to palpation of the supraspinatus.    Range of Motion   Active Abduction: abnormal   Passive Abduction:  70 abnormal   Forward Flexion:  90 (passive) abnormal   Forward Elevation: 80 (passive) abnormal  External Rotation 0 degrees:  30 abnormal   External Rotation 90 degrees: abnormal  Internal Rotation 0 degrees: abnormal   Internal Rotation 90 degrees: abnormal     Tests & Signs   Drop Arm: negative  Rotator Cuff Painful Arc/Range: severe    Other   Sensation: normal       Muscle Strength   Right Upper Extremity   Shoulder Abduction: 5/5   Shoulder Internal Rotation: 5/5   Shoulder External Rotation: 5/5   Supraspinatus: 5/5/5   Subscapularis: 5/5/5   Biceps: 5/5/5   Left Upper Extremity  Shoulder Abduction: 5/5   Shoulder Internal Rotation: 5/5   Shoulder External Rotation: 5/5   Supraspinatus: 5/5/5   Subscapularis: 5/5/5   Biceps: 5/5/5     Vascular Exam     Right Pulses      Radial:                    2+      Left Pulses      Radial:                    2+      Capillary Refill  Right Hand: normal capillary refill  Left Hand: normal capillary refill    Current and previous  radiographic studies and results were reviewed with the patient:     Bilateral Wrist:  3 views of each wrist demonstrate minimal degenerative changes at the first carpometacarpal joints but no evidence of fracture, subluxation or bony erosion.    Right Humerus:    There is no acute fracture or dislocation.  There is mild bony eburnation of the medial humeral epicondyle. (possible lateral cortical thinning that would correlate to the location of the patient's pain on exam)        Assessment:       Encounter Diagnoses   Name Primary?    Synovitis of wrist     Pain in both wrists     Pain of right humerus Yes    Shoulder stiffness, left     Supraspinatus tendon tear, left, subsequent encounter           Plan:           Continue with Gabapentin 300mg PO QHS  Continue with Mobic 15mg PO QD  MRI of right humerus to evaluate for bone lesion with her known history of malignancy  F/U after MRI if normal will initiate bilateral upper extremity PT for pain control; ROM and strengthening, will also see if she would like a left shoulder injection

## 2017-02-06 ENCOUNTER — TELEPHONE (OUTPATIENT)
Dept: ENDOCRINOLOGY | Facility: CLINIC | Age: 52
End: 2017-02-06

## 2017-02-06 NOTE — TELEPHONE ENCOUNTER
----- Message from Noemy Hoff MA sent at 2/6/2017  7:57 AM CST -----  [facility] patient  ----- Message -----     From: Shira Kaur     Sent: 2/4/2017   9:57 AM       To: [facility]on [facility] Staff (Endo)     (Alvaro)stated patient was given prescription for medication: Methimazole/has question about medication/please call back at [phone] to advise. (also would like test results)

## 2017-02-06 NOTE — TELEPHONE ENCOUNTER
I spoke to the patient and explained the results of Nuc Med uptake and scan. The patient is hyperactive and now on Tapazole. If she has a sore throat or fever, she is to stop the medicine and call us right away. The patient voiced and understanding of our conversation.

## 2017-02-16 ENCOUNTER — LAB VISIT (OUTPATIENT)
Dept: LAB | Facility: HOSPITAL | Age: 52
End: 2017-02-16
Attending: INTERNAL MEDICINE
Payer: MEDICAID

## 2017-02-16 ENCOUNTER — HOSPITAL ENCOUNTER (OUTPATIENT)
Dept: RADIOLOGY | Facility: HOSPITAL | Age: 52
Discharge: HOME | End: 2017-02-16
Attending: ORTHOPAEDIC SURGERY
Payer: MEDICAID

## 2017-02-16 DIAGNOSIS — D75.839 THROMBOCYTOSIS: ICD-10-CM

## 2017-02-16 DIAGNOSIS — M89.9 BONE/CARTILAGE DISORDER: ICD-10-CM

## 2017-02-16 DIAGNOSIS — M89.8X2 PAIN OF RIGHT HUMERUS: ICD-10-CM

## 2017-02-16 DIAGNOSIS — M94.9 BONE/CARTILAGE DISORDER: ICD-10-CM

## 2017-02-16 DIAGNOSIS — N18.30 CKD (CHRONIC KIDNEY DISEASE) STAGE 3, GFR 30-59 ML/MIN: ICD-10-CM

## 2017-02-16 DIAGNOSIS — C50.912 MALIGNANT NEOPLASM OF LEFT BREAST: ICD-10-CM

## 2017-02-16 DIAGNOSIS — N18.9 ANEMIA OF RENAL DISEASE: ICD-10-CM

## 2017-02-16 DIAGNOSIS — D63.1 ANEMIA OF RENAL DISEASE: ICD-10-CM

## 2017-02-16 LAB
ANION GAP SERPL CALC-SCNC: 11 MMOL/L
BASOPHILS # BLD AUTO: 0.05 K/UL
BASOPHILS # BLD AUTO: 0.05 K/UL
BASOPHILS NFR BLD: 0.6 %
BASOPHILS NFR BLD: 0.6 %
BUN SERPL-MCNC: 23 MG/DL
CALCIUM SERPL-MCNC: 9.6 MG/DL
CHLORIDE SERPL-SCNC: 109 MMOL/L
CO2 SERPL-SCNC: 23 MMOL/L
CREAT SERPL-MCNC: 0.9 MG/DL
DIFFERENTIAL METHOD: ABNORMAL
DIFFERENTIAL METHOD: ABNORMAL
EOSINOPHIL # BLD AUTO: 0.2 K/UL
EOSINOPHIL # BLD AUTO: 0.2 K/UL
EOSINOPHIL NFR BLD: 3 %
EOSINOPHIL NFR BLD: 3 %
ERYTHROCYTE [DISTWIDTH] IN BLOOD BY AUTOMATED COUNT: 13.6 %
ERYTHROCYTE [DISTWIDTH] IN BLOOD BY AUTOMATED COUNT: 13.6 %
EST. GFR  (AFRICAN AMERICAN): >60 ML/MIN/1.73 M^2
EST. GFR  (NON AFRICAN AMERICAN): >60 ML/MIN/1.73 M^2
GLUCOSE SERPL-MCNC: 135 MG/DL
HCT VFR BLD AUTO: 34.4 %
HCT VFR BLD AUTO: 34.4 %
HGB BLD-MCNC: 11.2 G/DL
HGB BLD-MCNC: 11.2 G/DL
LYMPHOCYTES # BLD AUTO: 2.4 K/UL
LYMPHOCYTES # BLD AUTO: 2.4 K/UL
LYMPHOCYTES NFR BLD: 31.4 %
LYMPHOCYTES NFR BLD: 31.4 %
MCH RBC QN AUTO: 28.5 PG
MCH RBC QN AUTO: 28.5 PG
MCHC RBC AUTO-ENTMCNC: 32.6 %
MCHC RBC AUTO-ENTMCNC: 32.6 %
MCV RBC AUTO: 88 FL
MCV RBC AUTO: 88 FL
MONOCYTES # BLD AUTO: 0.6 K/UL
MONOCYTES # BLD AUTO: 0.6 K/UL
MONOCYTES NFR BLD: 7.9 %
MONOCYTES NFR BLD: 7.9 %
NEUTROPHILS # BLD AUTO: 4.4 K/UL
NEUTROPHILS # BLD AUTO: 4.4 K/UL
NEUTROPHILS NFR BLD: 57.1 %
NEUTROPHILS NFR BLD: 57.1 %
PLATELET # BLD AUTO: 333 K/UL
PLATELET # BLD AUTO: 333 K/UL
PMV BLD AUTO: 9.5 FL
PMV BLD AUTO: 9.5 FL
POTASSIUM SERPL-SCNC: 5.2 MMOL/L
RBC # BLD AUTO: 3.93 M/UL
RBC # BLD AUTO: 3.93 M/UL
SODIUM SERPL-SCNC: 143 MMOL/L
WBC # BLD AUTO: 7.7 K/UL
WBC # BLD AUTO: 7.7 K/UL

## 2017-02-16 PROCEDURE — 36415 COLL VENOUS BLD VENIPUNCTURE: CPT | Mod: PO

## 2017-02-16 PROCEDURE — 73220 MRI UPPR EXTREMITY W/O&W/DYE: CPT | Mod: 26,RT,, | Performed by: RADIOLOGY

## 2017-02-16 PROCEDURE — 80048 BASIC METABOLIC PNL TOTAL CA: CPT | Mod: PO

## 2017-02-16 PROCEDURE — 85025 COMPLETE CBC W/AUTO DIFF WBC: CPT | Mod: PO

## 2017-02-16 RX ORDER — GADOBUTROL 604.72 MG/ML
7 INJECTION INTRAVENOUS
Status: COMPLETED | OUTPATIENT
Start: 2017-02-16 | End: 2017-02-16

## 2017-02-16 RX ADMIN — GADOBUTROL 7 ML: 604.72 INJECTION INTRAVENOUS at 12:02

## 2017-02-20 ENCOUNTER — TELEPHONE (OUTPATIENT)
Dept: HEMATOLOGY/ONCOLOGY | Facility: CLINIC | Age: 52
End: 2017-02-20

## 2017-02-20 NOTE — TELEPHONE ENCOUNTER
----- Message from Vinita Chu sent at 2/20/2017  1:22 PM CST -----  Contact: self  Patient wants cancel both of appointment for tomorrow. Please call back at [phone] (home)

## 2017-02-21 ENCOUNTER — TELEPHONE (OUTPATIENT)
Dept: HEMATOLOGY/ONCOLOGY | Facility: CLINIC | Age: 52
End: 2017-02-21

## 2017-02-24 ENCOUNTER — OFFICE VISIT (OUTPATIENT)
Dept: ORTHOPEDICS | Facility: CLINIC | Age: 52
End: 2017-02-24
Payer: MEDICAID

## 2017-02-24 VITALS
SYSTOLIC BLOOD PRESSURE: 174 MMHG | BODY MASS INDEX: 28.51 KG/M2 | HEART RATE: 80 BPM | DIASTOLIC BLOOD PRESSURE: 89 MMHG | HEIGHT: 64 IN | WEIGHT: 167 LBS

## 2017-02-24 DIAGNOSIS — R29.898 MUSCULAR DECONDITIONING: Primary | ICD-10-CM

## 2017-02-24 DIAGNOSIS — M25.532 PAIN IN BOTH WRISTS: ICD-10-CM

## 2017-02-24 DIAGNOSIS — M25.531 PAIN IN BOTH WRISTS: ICD-10-CM

## 2017-02-24 DIAGNOSIS — M89.8X2 PAIN OF RIGHT HUMERUS: ICD-10-CM

## 2017-02-24 DIAGNOSIS — R53.81 PHYSICAL DECONDITIONING: ICD-10-CM

## 2017-02-24 PROCEDURE — 99213 OFFICE O/P EST LOW 20 MIN: CPT | Mod: PBBFAC,PO | Performed by: ORTHOPAEDIC SURGERY

## 2017-02-24 PROCEDURE — 99999 PR PBB SHADOW E&M-EST. PATIENT-LVL III: CPT | Mod: PBBFAC,,, | Performed by: ORTHOPAEDIC SURGERY

## 2017-02-24 PROCEDURE — 99213 OFFICE O/P EST LOW 20 MIN: CPT | Mod: S$PBB,,, | Performed by: ORTHOPAEDIC SURGERY

## 2017-02-24 NOTE — PROGRESS NOTES
Subjective:          Chief Complaint: Dena Marcano is a 51 y.o. female who had concerns including Pain of the Right Shoulder.  Right humerus pain; left shoulder pain and stiffness    Pain   This is a new problem. The problem occurs constantly. The problem has been rapidly worsening. Associated symptoms include diaphoresis and myalgias. Pertinent negatives include no chest pain, chills, coughing, fever, headaches, nausea, rash or vomiting. She has tried acetaminophen for the symptoms. The treatment provided no relief.     Her wrist pain continues to be better and her main complaints now are her right arm pain.    Review of Systems   Constitution: Positive for diaphoresis. Negative for chills and fever.        Hair loss   HENT: Positive for tinnitus. Negative for ear pain, headaches, hearing loss and nosebleeds.    Eyes: Negative for pain, redness and visual disturbance.   Cardiovascular: Negative for chest pain and palpitations.   Respiratory: Negative for cough and shortness of breath.    Skin: Negative for itching and rash.   Musculoskeletal: Positive for joint pain, muscle weakness, myalgias and stiffness.   Gastrointestinal: Positive for diarrhea. Negative for constipation, nausea and vomiting.   Genitourinary: Negative for dysuria, frequency and hematuria.   Neurological: Negative for seizures.   Psychiatric/Behavioral: The patient is not nervous/anxious.    Allergic/Immunologic: Negative for environmental allergies.                   Objective:        General: Dena is well-developed, well-nourished, appears stated age, in no acute distress, alert and oriented to time, place and person.     General    Vitals reviewed.  Constitutional: She is oriented to person, place, and time. She appears well-developed and well-nourished. She appears distressed.   HENT:   Head: Normocephalic and atraumatic.   Nose: Nose normal.   Eyes: Pupils are equal, round, and reactive to light.   Cardiovascular: Normal rate.     Pulmonary/Chest: Effort normal.   Neurological: She is alert and oriented to person, place, and time.   Psychiatric: She has a normal mood and affect. Her behavior is normal. Judgment and thought content normal.   Patient is tearful regarding her arm pain on this visit and her inability to perform ADLs secondary to it. She is reluctant to understand that her MRI is normal             Right Hand/Wrist Exam     Inspection   Scars: Wrist - absent Hand -  absent  Effusion: Wrist - absent Hand -  absent  Bruising: Wrist - absent Hand -  absent  Deformity: Wrist - deformity Hand -  deformity    Other     Neuorologic Exam    Median Distribution: normal  Ulnar Distribution: normal  Radial Distribution: normal    Comments:  Good motion of bilateral hands and wrists; primarily complains of right arm pain      Left Hand/Wrist Exam     Inspection   Scars: Wrist - absent Hand -  absent  Effusion: Wrist - absent Hand -  absent  Bruising: Wrist - absent Hand -  absent  Deformity: Wrist - absent Hand -  absent    Other     Sensory Exam  Median Distribution: normal  Ulnar Distribution: normal  Radial Distribution: normal      Right Shoulder Exam     Inspection/Observation   Swelling: absent  Bruising: absent  Scars: absent  Deformity: absent  Scapular Winging: absent  Scapular Dyskinesia: positive  Atrophy: absent    Tenderness   Right shoulder tenderness location: midshaft humerus with possible swelling noted.    Range of Motion   Active Abduction: normal   Passive Abduction: normal   Extension: normal   Forward Flexion: normal   Forward Elevation: normal  Adduction: normal  External Rotation 0 degrees:  70 normal   External Rotation 90 degrees: normal  Internal Rotation 0 degrees: normal   Internal Rotation 90 degrees: normal     Tests & Signs   Drop Arm: negative  Rotator Cuff Painful Arc/Range: mild    Other   Sensation: normal    Left Shoulder Exam     Inspection/Observation   Swelling: absent  Bruising: absent  Scars:  absent  Deformity: absent  Scapular Dyskinesia: positive  Atrophy: present    Tenderness   The patient is tender to palpation of the supraspinatus.    Range of Motion   Active Abduction: abnormal   Passive Abduction:  70 abnormal   Forward Flexion:  90 (passive) abnormal   Forward Elevation: 80 (passive) abnormal  External Rotation 0 degrees:  30 abnormal   External Rotation 90 degrees: abnormal  Internal Rotation 0 degrees: abnormal   Internal Rotation 90 degrees: abnormal     Tests & Signs   Drop Arm: negative  Rotator Cuff Painful Arc/Range: severe    Other   Sensation: normal       Muscle Strength   Right Upper Extremity   Shoulder Abduction: 5/5   Shoulder Internal Rotation: 5/5   Shoulder External Rotation: 5/5   Supraspinatus: 5/5/5   Subscapularis: 5/5/5   Biceps: 5/5/5   Left Upper Extremity  Shoulder Abduction: 5/5   Shoulder Internal Rotation: 5/5   Shoulder External Rotation: 5/5   Supraspinatus: 5/5/5   Subscapularis: 5/5/5   Biceps: 5/5/5     Vascular Exam     Right Pulses      Radial:                    2+      Left Pulses      Radial:                    2+      Capillary Refill  Right Hand: normal capillary refill  Left Hand: normal capillary refill    Current and previous radiographic studies and results were reviewed with the patient:     Bilateral Wrist:  3 views of each wrist demonstrate minimal degenerative changes at the first carpometacarpal joints but no evidence of fracture, subluxation or bony erosion.    Right Humerus:    There is no acute fracture or dislocation.  There is mild bony eburnation of the medial humeral epicondyle. (possible lateral cortical thinning that would correlate to the location of the patient's pain on exam)    MRI:  Pre-and post gadolinium images are obtained through the right humerus.  The marrow signal of the humerus is within normal limits.  There is no evidence of muscular or vascular mass along the right arm.  This the subcutaneous tissues are also unremarkable.   There is no evidence of abnormal enhancement.  There is no evidence of axillary adenopathy.   Impression    Essentially normal MRI of the right humerus and arm           Assessment:       Encounter Diagnoses   Name Primary?    Pain of right humerus     Pain in both wrists     Muscular deconditioning Yes    Physical deconditioning           Plan:           Continue with Gabapentin 300mg PO QHS  Continue with Mobic 15mg PO QD  MRI is negative for any structural pathology. We will being physical therapy in order to assist with pain control, ROM and return to function  I have also again encouraged Mrs. Marcano to speak with the providers that order/administer the injection/infusion that she states started her arm pain. It may be that this is a common side effect of the injection/infusion or it may not be but they need to understand that she has developed these symptoms since then and that is why she recently cancelled the one she was supposed to receive.  I have also again encouraged her to f/u with her endocrinologist regarding the questions she has about her thyroid tests/function/medication and symptoms that may be secondary to her thyroid disease.  Her daughters were present and will help her to follow through with these plans.

## 2017-02-27 ENCOUNTER — TELEPHONE (OUTPATIENT)
Dept: ENDOCRINOLOGY | Facility: CLINIC | Age: 52
End: 2017-02-27

## 2017-02-27 ENCOUNTER — TELEPHONE (OUTPATIENT)
Dept: HEMATOLOGY/ONCOLOGY | Facility: CLINIC | Age: 52
End: 2017-02-27

## 2017-02-27 NOTE — TELEPHONE ENCOUNTER
----- Message from Marci Gutiérrez sent at 2/24/2017  4:45 PM CST -----  Contact: Daughter Elisa Marcano [phone]  Please call her, her mom needs a sooner appt to see you.  Thank you!

## 2017-02-27 NOTE — TELEPHONE ENCOUNTER
----- Message from Parul Ochoa sent at 2/27/2017 12:33 PM CST -----  Test results. Please call patient at [phone].

## 2017-02-27 NOTE — TELEPHONE ENCOUNTER
Called pt regarding message about test results.  Pt's  Alvaro asked if we could mail her results.  Mailed results.  Pt verbalized understanding.

## 2017-03-01 ENCOUNTER — LAB VISIT (OUTPATIENT)
Dept: LAB | Facility: HOSPITAL | Age: 52
End: 2017-03-01
Attending: INTERNAL MEDICINE
Payer: MEDICAID

## 2017-03-01 DIAGNOSIS — E11.9 TYPE 2 DIABETES MELLITUS WITHOUT COMPLICATION, WITHOUT LONG-TERM CURRENT USE OF INSULIN: ICD-10-CM

## 2017-03-01 DIAGNOSIS — E05.90 HYPERTHYROIDISM: ICD-10-CM

## 2017-03-01 LAB
ALBUMIN SERPL BCP-MCNC: 3.7 G/DL
ALP SERPL-CCNC: 57 U/L
ALT SERPL W/O P-5'-P-CCNC: 11 U/L
AST SERPL-CCNC: 11 U/L
BILIRUB DIRECT SERPL-MCNC: 0.1 MG/DL
BILIRUB SERPL-MCNC: 0.2 MG/DL
PROT SERPL-MCNC: 7.4 G/DL
T4 FREE SERPL-MCNC: 1.1 NG/DL
TSH SERPL DL<=0.005 MIU/L-ACNC: 0.24 UIU/ML

## 2017-03-01 PROCEDURE — 84443 ASSAY THYROID STIM HORMONE: CPT

## 2017-03-01 PROCEDURE — 80076 HEPATIC FUNCTION PANEL: CPT

## 2017-03-01 PROCEDURE — 36415 COLL VENOUS BLD VENIPUNCTURE: CPT | Mod: PO

## 2017-03-01 PROCEDURE — 84439 ASSAY OF FREE THYROXINE: CPT

## 2017-03-01 PROCEDURE — 83036 HEMOGLOBIN GLYCOSYLATED A1C: CPT

## 2017-03-02 LAB
ESTIMATED AVG GLUCOSE: 171 MG/DL
HBA1C MFR BLD HPLC: 7.6 %

## 2017-03-03 ENCOUNTER — TELEPHONE (OUTPATIENT)
Dept: ENDOCRINOLOGY | Facility: CLINIC | Age: 52
End: 2017-03-03

## 2017-03-03 DIAGNOSIS — E03.9 HYPOTHYROIDISM, UNSPECIFIED TYPE: Primary | ICD-10-CM

## 2017-03-06 NOTE — TELEPHONE ENCOUNTER
I left a message for the patient about her thyroid lab results, and the need to repeat them in 6 weeks. I will mail her appointment to her.

## 2017-03-09 RX ORDER — BLOOD SUGAR DIAGNOSTIC
STRIP MISCELLANEOUS
Qty: 25 STRIP | Refills: 2 | Status: SHIPPED | OUTPATIENT
Start: 2017-03-09

## 2017-03-14 ENCOUNTER — CLINICAL SUPPORT (OUTPATIENT)
Dept: DIABETES | Facility: CLINIC | Age: 52
End: 2017-03-14
Payer: MEDICAID

## 2017-03-14 VITALS
SYSTOLIC BLOOD PRESSURE: 142 MMHG | HEART RATE: 78 BPM | HEIGHT: 64 IN | BODY MASS INDEX: 29.38 KG/M2 | DIASTOLIC BLOOD PRESSURE: 76 MMHG | WEIGHT: 172.06 LBS

## 2017-03-14 DIAGNOSIS — N18.30 CKD (CHRONIC KIDNEY DISEASE) STAGE 3, GFR 30-59 ML/MIN: ICD-10-CM

## 2017-03-14 DIAGNOSIS — E11.9 TYPE 2 DIABETES MELLITUS WITHOUT COMPLICATION, WITHOUT LONG-TERM CURRENT USE OF INSULIN: Primary | ICD-10-CM

## 2017-03-14 DIAGNOSIS — I10 ESSENTIAL HYPERTENSION: ICD-10-CM

## 2017-03-14 DIAGNOSIS — E78.5 HYPERLIPIDEMIA LDL GOAL <70: ICD-10-CM

## 2017-03-14 PROCEDURE — 99999 PR PBB SHADOW E&M-EST. PATIENT-LVL III: CPT | Mod: PBBFAC,,,

## 2017-03-14 PROCEDURE — 99214 OFFICE O/P EST MOD 30 MIN: CPT | Mod: S$PBB,,, | Performed by: NURSE PRACTITIONER

## 2017-03-14 NOTE — PROGRESS NOTES
CC:  Uncontrolled Type 2 DM     HPI:  Dena Marcano 51 y.o.  or Other  female referred by Dr. Saha/AMI Rose NP  for eval of Diabetes.      [facility]      The patient has had Type 2  Diabetes mellitus  diagnosed approximately 2013 along with known associated co-morbidities pending review  including HTN, HLP.  Pt has also been treated for breast cancer in the last year with lumpectomy and RTX.  Pt was prescribed metformin and glipizide.  She never started the glipizide because she thought she was taking too many pills already.  She knows nothing about an a1c.  She checks fbs about EVERY OTHER DAY.           INterim Events:  Pt  Doing well. At last visit glimipiride 2mg was stopped and pt remained on  Metformin 1000 bid only.  A1C not optimal, but much improved.  Pt has primarily given up rice.  No hypoglycemia.  Sporadically checking glucoses. Pt had been complaining of general myalgias so per my suggestion mid Jan I advised her to stop atorvastatin for a couple of weeks and see if it resolved.  Doesn't appear to have made any difference.  Pt inquiring as to lipid levels at present.       CURRENT DIABETES MEDICATIONS: metformin 1000 mg  twice daily,     SIGNIFICANT DIABETES MED HISTORY: metformin 500 twice daily     HOSPITALIZATIONS FOR DIABETES OR RELATED COMPLICATIONS:  No    BLOOD GLUCOSE MONITORING:  n          HYPOGLYCEMIA:  None       DIABETES COMPLICATIONS: none     CURRENT A1C:      OCCUPATION: none     CURRENT DAILY ROUTINE (meals/meds): pending review     CURRENT EXERCISE:  None     ALCOHOL: none    SMOKING: none     SUPPORT SYSTEM: none     DIABETES EDUCATION HISTORY: none     MEDICATIONS, ALLERGIES, LABS, VS's, MEDICAL, SURGICAL and SOCIAL HISTORY reviewed and updated in the appropriate sections during this encounter.    ROS:   Constitutional: Negative for weight change  Endocrine:  Denies polyuria, polydipsia,  Or nocturia  HENT: Negative for mouth  sores or dental problems. Denies any personal or family history of thyroid cancer.  Denies neck swelling, lumps, horseness or trouble swallowing.   Eyes: Negative for visual disturbance.   Respiratory: Negative for cough or shortness or breath.  Cardiovascular: Negative for chest pain or claudication, no history of amputation.  Gastrointestinal: Negative for nausea, vomiting, bloating.  No history of pancreatitis or gastroparesis.  Genitourinary: Negative for urgency, frequency, frequent urinary tract infections.  Musculoskeletal: Negative for back pain.   Skin: Denies prolonged wound healing, negative for rashes.  Neurological: Negative for syncope, gustatory sweating, numbness/burning of extremities.  Psychiatric/Behavioral: Negative for depression or anxiety  All other systems reviewed and are negative.    CGMS interpretation:       PE:  Constitutional:    Vitals:    03/14/17 1016   BP: (!) 142/76   Pulse: 78        Well developed, well nourished, NAD.  EMNT:   External ears, nose: no masses. No significant findings.   Hearing: normal   Lips, teeth and gums:  Lips/oral mucosa normal, upper denttures  Neck:  No trachial deviation present, No neck masses noticed   Thyroid:  No thyromegaly present.  No thyroid tenderness.    No carotid bruits.  Cardiovascular:    Auscultation:  No murmurs or abnormal sounds   Lower extremities:  No edema or cyanosis.  Respiratory:    Effort:  Normal, no use of accessory muscles.   Auscultation: breath sounds normal, no adventitious sounds.  Abdomen:     Exam:    Skin:    Inspection: no rashes, lesion or ulcers,    Palpation: no induration or nodules.    Insulin injection sites: no lipoatrophy or lipohypertrophy.    Psychiatric:  Judgement and insight good with normal mood and affect.    Musculoskeletal:  Gait steady. No gross abnormalities. C/u upper arm pain, cramping, weekness, hands locking up bilaterally     Neurological:  Moves all extremities.  No tremor or facial asymmetry  noted.    FOOT EXAMINATION:   No foot deformity, corns or callus formation,  nails in good condiiton and well trimmed, no interspace maceration or ulceration noted.  Hair growth present over toes/feet.  Positive vibratory response to 128 Hz tuning fork bilaterally.  Protective sensation intact with 10 gram monofilament.    Protective Sensation (w/ 10 gram monofilament):  Right: Intact  Left: Intact    Visual Inspection:  Dry Skin -  Bilateral    Pedal Pulses:   Right: Present  Left: Present    Posterior tibialis:   Right:Present  Left: Present        Foot Exam Risk Classification: 0  Definition of risk classification:  0 = No LOPD (monofilament or vibratory), no PAD  1= LOPS + /- deformity  2 = PAD +/ - LOPS  3 = History of ulcer or amputation      LABS  Hemoglobin A1C   Date Value Ref Range Status   03/01/2017 7.6 (H) 4.5 - 6.2 % Final     Comment:     According to ADA guidelines, hemoglobin A1C <7.0% represents  optimal control in non-pregnant diabetic patients.  Different  metrics may apply to specific populations.   Standards of Medical Care in Diabetes - 2016.  For the purpose of screening for the presence of diabetes:  <5.7%     Consistent with the absence of diabetes  5.7-6.4%  Consistent with increasing risk for diabetes   (prediabetes)  >or=6.5%  Consistent with diabetes  Currently no consensus exists for use of hemoglobin A1C  for diagnosis of diabetes for children.     01/09/2017 9.8 (H) 4.5 - 6.2 % Final     Comment:     According to ADA guidelines, hemoglobin A1C <7.0% represents  optimal control in non-pregnant diabetic patients.  Different  metrics may apply to specific populations.   Standards of Medical Care in Diabetes - 2016.  For the purpose of screening for the presence of diabetes:  <5.7%     Consistent with the absence of diabetes  5.7-6.4%  Consistent with increasing risk for diabetes   (prediabetes)  >or=6.5%  Consistent with diabetes  Currently no consensus exists for use of hemoglobin  A1C  for diagnosis of diabetes for children.     10/04/2016 11.5 (H) 4.5 - 6.2 % Final     Comment:     According to ADA guidelines, hemoglobin A1C <7.0% represents  optimal control in non-pregnant diabetic patients.  Different  metrics may apply to specific populations.   Standards of Medical Care in Diabetes - 2016.  For the purpose of screening for the presence of diabetes:  <5.7%     Consistent with the absence of diabetes  5.7-6.4%  Consistent with increasing risk for diabetes   (prediabetes)  >or=6.5%  Consistent with diabetes  Currently no consensus exists for use of hemoglobin A1C  for diagnosis of diabetes for children.         Chemistry        Component Value Date/Time     02/16/2017 1123    K 5.2 (H) 02/16/2017 1123     02/16/2017 1123    CO2 23 02/16/2017 1123    BUN 23 (H) 02/16/2017 1123    CREATININE 0.9 02/16/2017 1123     (H) 02/16/2017 1123        Component Value Date/Time    CALCIUM 9.6 02/16/2017 1123    ALKPHOS 57 03/01/2017 1243    AST 11 03/01/2017 1243    ALT 11 03/01/2017 1243    BILITOT 0.2 03/01/2017 1243          Lab Results   Component Value Date    CHOL 174 08/09/2016    CHOL 174 08/09/2016    CHOL 147 07/30/2015     Lab Results   Component Value Date    HDL 43 08/09/2016    HDL 43 08/09/2016    HDL 42 07/30/2015     Lab Results   Component Value Date    LDLCALC 100.6 08/09/2016    LDLCALC 100.6 08/09/2016    LDLCALC 82.0 07/30/2015     Lab Results   Component Value Date    TRIG 152 (H) 08/09/2016    TRIG 152 (H) 08/09/2016    TRIG 115 07/30/2015     Lab Results   Component Value Date    CHOLHDL 24.7 08/09/2016    CHOLHDL 24.7 08/09/2016    CHOLHDL 28.6 07/30/2015     @RESUFAST([facility])    STANDARDS of CARE:  Statin:  Yes - atorvastatin   ACEI or ARB:  Yes - lisinipril 5 mg   ASA:  No      ______________________________________________________________________     ASSESSMENT  1. Type 2 diabetes mellitus without complication, without long-term current use of insulin  Lipid  panel  Chronic-improved--see plan     Microalbumin/creatinine urine ratio   2. Hyperlipidemia LDL goal <70  -chronic-monitor-need to recheck    3. CKD (chronic kidney disease) stage 3, GFR 30-59 ml/min  0chronic-improved.    4. Essential hypertension  -chronic-stable-cont lisinopril          PLAN    -Advised to SBGM TWICE DAILY and alternate  -Needs discrete meals.   -  Inncrease metformin to 1000 mg twice a day  --Pt goint to [place] for 2-3 months--advised if she sees constinent glucoses in 180 or greater range can add back 1 mg  (1/2 of 2 mg tab) of glimipiride.   verbalizes understanding.     Increase Lisinopril from 5 to 10 mg daily.     Stop atorvastatin for a few weeks and see if muscle pain goes aways      Goal glucoses-- most of the time.      ORDERS  3/14/17  Call with lipid results.   F/u Dr. Scott,  Can return prn.           [facility] EMPOWERMENT [facility] PATIENT SUMMARY  Your patient Dena Marcano   has been enrolled in the Diabetes [facility].  During this 6 month period your patient has received treatment  by the Endocrine Nurse Practitioner /Certified Diabetes Educator team.  To ensure continuity of care we would like to share some important discharge information with you.  Also please refer to the last Diabetes Empowerment chart note for further details.    A1C goal:  ~6.6-7%    ADMISSION A1C/problem areas:  11.5%     DIABETES MEDICATIONS AT START OF PROGRAM:       DISCHARGE A1C : 7.6%     SUMMARY OF MANAGEMENT /MEDICATIONS AND PLAN OF CARE:    --education provided  -metformin increased to 1000 mg bid  -2 mg glimipiride added--but later discontinued all together   -increase lisinopirl from 5 to 10 mg  -added atorvastatin 20  Mg--it was stopped--but pending restart based on labs--doesn't seem to be culprit associated with myalgias.     PATIENT BARRIERS:  Diet related to cultural differences.     HEALTH M AINTENANCE - currently up to date; see last chart note for  details.    DIABETES EDUCATION STATUS - recommend yearly diabetes education.    Thank you for allowing the [facility] team to care for your patient.  Patient requests for refills on medications will now be referred to you.  Please feel free to contact us with any questions or concerns.  Your patient can be sent back at any time if needed.

## 2017-03-14 NOTE — PROGRESS NOTES
CC:  Uncontrolled Type 2 DM     HPI:  Dena Marcano 51 y.o.  or Other  female referred by Dr. Saha/AMI Rose NP  for eval of Diabetes.      [facility].   ---back today for interim glucose log review.     The patient has had Type 2  Diabetes mellitus  diagnosed approximately 2013 along with known associated co-morbidities pending review  including HTN, HLP.  Pt has also been treated for breast cancer in the last year with lumpectomy and RTX.  Pt was prescribed metformin and glipizide.  She never started the glipizide because she thought she was taking too many pills already.  She knows nothing about an a1c.  She checks fbs about EVERY OTHER DAY.    Interim Events:  Pt doing fantastic.  Taking metformin 1000 bid,  Was taking glimipiride but started having glucoses in 50-60--so glimpiride was stopped per Dr. Barba as she is seeing him for subclinical hypothyroidism. Has also been watching diet.             CURRENT DIABETES MEDICATIONS: metformin 1000 bid--glimipiride 2 mg-just stopped.     SIGNIFICANT DIABETES MED HISTORY: metformin 500 twice daily     HOSPITALIZATIONS FOR DIABETES OR RELATED COMPLICATIONS:  No    BLOOD GLUCOSE MONITORING:  n          HYPOGLYCEMIA: yes    DIABETES COMPLICATIONS: none     CURRENT A1C:      OCCUPATION: none     CURRENT DAILY ROUTINE (meals/meds): pending review     CURRENT EXERCISE:  None     ALCOHOL: none    SMOKING: none     SUPPORT SYSTEM: none     DIABETES EDUCATION HISTORY: none     MEDICATIONS, ALLERGIES, LABS, VS's, MEDICAL, SURGICAL and SOCIAL HISTORY reviewed and updated in the appropriate sections during this encounter.    ROS:     CGMS interpretation:       PE:  Constitutional:    Vitals:    03/14/17 1016   BP: (!) 142/76   Pulse: 78        Well developed, well nourished, NAD.      LABS  Hemoglobin A1C   Date Value Ref Range Status   03/01/2017 7.6 (H) 4.5 - 6.2 % Final     Comment:     According to ADA guidelines,  hemoglobin A1C <7.0% represents  optimal control in non-pregnant diabetic patients.  Different  metrics may apply to specific populations.   Standards of Medical Care in Diabetes - 2016.  For the purpose of screening for the presence of diabetes:  <5.7%     Consistent with the absence of diabetes  5.7-6.4%  Consistent with increasing risk for diabetes   (prediabetes)  >or=6.5%  Consistent with diabetes  Currently no consensus exists for use of hemoglobin A1C  for diagnosis of diabetes for children.     01/09/2017 9.8 (H) 4.5 - 6.2 % Final     Comment:     According to ADA guidelines, hemoglobin A1C <7.0% represents  optimal control in non-pregnant diabetic patients.  Different  metrics may apply to specific populations.   Standards of Medical Care in Diabetes - 2016.  For the purpose of screening for the presence of diabetes:  <5.7%     Consistent with the absence of diabetes  5.7-6.4%  Consistent with increasing risk for diabetes   (prediabetes)  >or=6.5%  Consistent with diabetes  Currently no consensus exists for use of hemoglobin A1C  for diagnosis of diabetes for children.     10/04/2016 11.5 (H) 4.5 - 6.2 % Final     Comment:     According to ADA guidelines, hemoglobin A1C <7.0% represents  optimal control in non-pregnant diabetic patients.  Different  metrics may apply to specific populations.   Standards of Medical Care in Diabetes - 2016.  For the purpose of screening for the presence of diabetes:  <5.7%     Consistent with the absence of diabetes  5.7-6.4%  Consistent with increasing risk for diabetes   (prediabetes)  >or=6.5%  Consistent with diabetes  Currently no consensus exists for use of hemoglobin A1C  for diagnosis of diabetes for children.         Chemistry        Component Value Date/Time     02/16/2017 1123    K 5.2 (H) 02/16/2017 1123     02/16/2017 1123    CO2 23 02/16/2017 1123    BUN 23 (H) 02/16/2017 1123    CREATININE 0.9 02/16/2017 1123     (H) 02/16/2017 1123         Component Value Date/Time    CALCIUM 9.6 02/16/2017 1123    ALKPHOS 57 03/01/2017 1243    AST 11 03/01/2017 1243    ALT 11 03/01/2017 1243    BILITOT 0.2 03/01/2017 1243          Lab Results   Component Value Date    CHOL 174 08/09/2016    CHOL 174 08/09/2016    CHOL 147 07/30/2015     Lab Results   Component Value Date    HDL 43 08/09/2016    HDL 43 08/09/2016    HDL 42 07/30/2015     Lab Results   Component Value Date    LDLCALC 100.6 08/09/2016    LDLCALC 100.6 08/09/2016    LDLCALC 82.0 07/30/2015     Lab Results   Component Value Date    TRIG 152 (H) 08/09/2016    TRIG 152 (H) 08/09/2016    TRIG 115 07/30/2015     Lab Results   Component Value Date    CHOLHDL 24.7 08/09/2016    CHOLHDL 24.7 08/09/2016    CHOLHDL 28.6 07/30/2015     @RESUFAST([facility])    STANDARDS of CARE:  Statin:  Yes - atorvastatin   ACEI or ARB:  Yes - lisinipril 5 mg   ASA:  No      ______________________________________________________________________     ASSESSMENT  No diagnosis found.      PLAN  -continue metfformin 1000 bid,  Pt advised to continue checking glucoses qd but at different times.  If glucoses trend up will consider 1 mg glimipiride.     ORDERS  3/14/17  Fasting lipids today.   Urine m./c today,

## 2017-03-22 ENCOUNTER — TELEPHONE (OUTPATIENT)
Dept: HEMATOLOGY/ONCOLOGY | Facility: CLINIC | Age: 52
End: 2017-03-22

## 2017-03-22 NOTE — TELEPHONE ENCOUNTER
----- Message from Debby Arora sent at 3/22/2017 10:19 AM CDT -----  Contact:    - Alvaro Marcano - [phone] called and is wanting all of patient's appts scheduled for now/they will reschedule later in the year/please cancel 08 17 17 - 08 21 17 and 08 21 17/ also said that no one needs to call them back to reschedule

## 2017-04-10 DIAGNOSIS — E11.9 TYPE 2 DIABETES MELLITUS WITHOUT COMPLICATION, WITHOUT LONG-TERM CURRENT USE OF INSULIN: ICD-10-CM

## 2017-04-11 RX ORDER — METFORMIN HYDROCHLORIDE 1000 MG/1
TABLET ORAL
Qty: 180 TABLET | Refills: 2 | Status: SHIPPED | OUTPATIENT
Start: 2017-04-11

## 2017-05-05 DIAGNOSIS — M65.9 SYNOVITIS OF WRIST: ICD-10-CM

## 2017-05-05 DIAGNOSIS — M25.532 PAIN IN BOTH WRISTS: ICD-10-CM

## 2017-05-05 DIAGNOSIS — M25.531 PAIN IN BOTH WRISTS: ICD-10-CM

## 2017-05-05 RX ORDER — MELOXICAM 15 MG/1
TABLET ORAL
Qty: 60 TABLET | Refills: 0 | Status: SHIPPED | OUTPATIENT
Start: 2017-05-05

## 2017-06-16 DIAGNOSIS — E11.9 TYPE 2 DIABETES MELLITUS WITHOUT COMPLICATION: ICD-10-CM

## 2017-06-22 DIAGNOSIS — M25.532 PAIN IN BOTH WRISTS: ICD-10-CM

## 2017-06-22 DIAGNOSIS — M65.9 SYNOVITIS OF WRIST: ICD-10-CM

## 2017-06-22 DIAGNOSIS — M25.531 PAIN IN BOTH WRISTS: ICD-10-CM

## 2017-06-22 RX ORDER — GABAPENTIN 300 MG/1
300 CAPSULE ORAL NIGHTLY
Qty: 90 CAPSULE | Refills: 0 | Status: SHIPPED | OUTPATIENT
Start: 2017-06-22 | End: 2018-06-22

## 2017-07-07 DIAGNOSIS — M25.531 PAIN IN BOTH WRISTS: ICD-10-CM

## 2017-07-07 DIAGNOSIS — M25.532 PAIN IN BOTH WRISTS: ICD-10-CM

## 2017-07-07 DIAGNOSIS — M65.9 SYNOVITIS OF WRIST: ICD-10-CM

## 2017-07-08 RX ORDER — MELOXICAM 15 MG/1
15 TABLET ORAL DAILY
Qty: 60 TABLET | Refills: 0 | Status: SHIPPED | OUTPATIENT
Start: 2017-07-08

## 2017-09-04 RX ORDER — METHIMAZOLE 5 MG/1
5 TABLET ORAL DAILY
Qty: 30 TABLET | Refills: 6 | Status: SHIPPED | OUTPATIENT
Start: 2017-09-04 | End: 2018-09-04

## 2017-11-16 ENCOUNTER — TELEPHONE (OUTPATIENT)
Dept: FAMILY MEDICINE | Facility: CLINIC | Age: 52
End: 2017-11-16

## 2017-11-16 NOTE — TELEPHONE ENCOUNTER
----- Message from Debby Goldstein sent at 11/16/2017  1:48 PM CST -----  Contact: Izzy from [facility]  Asking for company of MAMMO result done 8/18/17 to be faxed to [phone]. Thanks!

## 2017-11-16 NOTE — TELEPHONE ENCOUNTER
Spoke to patient's  to confirm this phone call. States the doctor office in [place] is needing the Mammogram report to compare. Faxing to number below.

## 2017-11-30 ENCOUNTER — TELEPHONE (OUTPATIENT)
Dept: RADIOLOGY | Facility: HOSPITAL | Age: 52
End: 2017-11-30